# Patient Record
Sex: FEMALE | Race: WHITE | Employment: UNEMPLOYED | URBAN - METROPOLITAN AREA
[De-identification: names, ages, dates, MRNs, and addresses within clinical notes are randomized per-mention and may not be internally consistent; named-entity substitution may affect disease eponyms.]

---

## 2023-12-15 ENCOUNTER — HOSPITAL ENCOUNTER (EMERGENCY)
Age: 73
Discharge: HOME | End: 2023-12-16
Attending: EMERGENCY MEDICINE
Payer: MEDICARE

## 2023-12-15 DIAGNOSIS — R73.9 HYPERGLYCEMIA: Primary | ICD-10-CM

## 2023-12-15 DIAGNOSIS — R79.89 LFTS ABNORMAL: ICD-10-CM

## 2023-12-15 LAB
ALBUMIN SERPL-MCNC: 4.1 G/DL (ref 3.5–5.2)
ALP SERPL-CCNC: 250 U/L (ref 35–104)
ALT SERPL-CCNC: 39 U/L (ref 0–32)
ANION GAP SERPL CALCULATED.3IONS-SCNC: 15 MMOL/L (ref 7–16)
AST SERPL-CCNC: 35 U/L (ref 0–31)
B-OH-BUTYR SERPL-MCNC: 0.25 MMOL/L (ref 0.02–0.27)
BACTERIA URNS QL MICRO: ABNORMAL
BASOPHILS # BLD: 0.06 K/UL (ref 0–0.2)
BASOPHILS NFR BLD: 1 % (ref 0–2)
BILIRUB SERPL-MCNC: 0.5 MG/DL (ref 0–1.2)
BILIRUB UR QL STRIP: NEGATIVE
BUN SERPL-MCNC: 13 MG/DL (ref 6–23)
CALCIUM SERPL-MCNC: 9.3 MG/DL (ref 8.6–10.2)
CHLORIDE SERPL-SCNC: 95 MMOL/L (ref 98–107)
CLARITY UR: CLEAR
CO2 SERPL-SCNC: 22 MMOL/L (ref 22–29)
COLOR UR: YELLOW
CREAT SERPL-MCNC: 0.7 MG/DL (ref 0.5–1)
EOSINOPHIL # BLD: 0.18 K/UL (ref 0.05–0.5)
EOSINOPHILS RELATIVE PERCENT: 2 % (ref 0–6)
EPI CELLS #/AREA URNS HPF: ABNORMAL /HPF
ERYTHROCYTE [DISTWIDTH] IN BLOOD BY AUTOMATED COUNT: 12.3 % (ref 11.5–15)
GFR SERPL CREATININE-BSD FRML MDRD: >60 ML/MIN/1.73M2
GLUCOSE BLD-MCNC: 330 MG/DL (ref 74–99)
GLUCOSE SERPL-MCNC: 338 MG/DL (ref 74–99)
GLUCOSE UR STRIP-MCNC: >=1000 MG/DL
HCT VFR BLD AUTO: 46.1 % (ref 34–48)
HGB BLD-MCNC: 16 G/DL (ref 11.5–15.5)
HGB UR QL STRIP.AUTO: NEGATIVE
IMM GRANULOCYTES # BLD AUTO: 0.03 K/UL (ref 0–0.58)
IMM GRANULOCYTES NFR BLD: 0 % (ref 0–5)
KETONES UR STRIP-MCNC: NEGATIVE MG/DL
LACTATE BLDV-SCNC: 2.9 MMOL/L (ref 0.5–2.2)
LEUKOCYTE ESTERASE UR QL STRIP: NEGATIVE
LIPASE SERPL-CCNC: 17 U/L (ref 13–60)
LYMPHOCYTES NFR BLD: 2.98 K/UL (ref 1.5–4)
LYMPHOCYTES RELATIVE PERCENT: 38 % (ref 20–42)
MAGNESIUM SERPL-MCNC: 2.2 MG/DL (ref 1.6–2.6)
MCH RBC QN AUTO: 30.9 PG (ref 26–35)
MCHC RBC AUTO-ENTMCNC: 34.7 G/DL (ref 32–34.5)
MCV RBC AUTO: 89 FL (ref 80–99.9)
MONOCYTES NFR BLD: 0.44 K/UL (ref 0.1–0.95)
MONOCYTES NFR BLD: 6 % (ref 2–12)
NEUTROPHILS NFR BLD: 53 % (ref 43–80)
NEUTS SEG NFR BLD: 4.17 K/UL (ref 1.8–7.3)
NITRITE UR QL STRIP: NEGATIVE
PH UR STRIP: 6 [PH] (ref 5–9)
PH VENOUS: 7.33 (ref 7.35–7.45)
PLATELET # BLD AUTO: 207 K/UL (ref 130–450)
PMV BLD AUTO: 9.7 FL (ref 7–12)
POTASSIUM SERPL-SCNC: 4.5 MMOL/L (ref 3.5–5)
PROT SERPL-MCNC: 7.6 G/DL (ref 6.4–8.3)
PROT UR STRIP-MCNC: NEGATIVE MG/DL
RBC # BLD AUTO: 5.18 M/UL (ref 3.5–5.5)
RBC #/AREA URNS HPF: ABNORMAL /HPF
SODIUM SERPL-SCNC: 132 MMOL/L (ref 132–146)
SP GR UR STRIP: <1.005 (ref 1–1.03)
UROBILINOGEN UR STRIP-ACNC: 0.2 EU/DL (ref 0–1)
WBC #/AREA URNS HPF: ABNORMAL /HPF
WBC OTHER # BLD: 7.9 K/UL (ref 4.5–11.5)

## 2023-12-15 PROCEDURE — 2580000003 HC RX 258

## 2023-12-15 PROCEDURE — 82962 GLUCOSE BLOOD TEST: CPT

## 2023-12-15 PROCEDURE — 80053 COMPREHEN METABOLIC PANEL: CPT

## 2023-12-15 PROCEDURE — 82800 BLOOD PH: CPT

## 2023-12-15 PROCEDURE — 83605 ASSAY OF LACTIC ACID: CPT

## 2023-12-15 PROCEDURE — 83690 ASSAY OF LIPASE: CPT

## 2023-12-15 PROCEDURE — 81001 URINALYSIS AUTO W/SCOPE: CPT

## 2023-12-15 PROCEDURE — 82010 KETONE BODYS QUAN: CPT

## 2023-12-15 PROCEDURE — 83735 ASSAY OF MAGNESIUM: CPT

## 2023-12-15 PROCEDURE — 85025 COMPLETE CBC W/AUTO DIFF WBC: CPT

## 2023-12-15 RX ORDER — 0.9 % SODIUM CHLORIDE 0.9 %
1000 INTRAVENOUS SOLUTION INTRAVENOUS ONCE
Status: COMPLETED | OUTPATIENT
Start: 2023-12-15 | End: 2023-12-16

## 2023-12-15 RX ADMIN — SODIUM CHLORIDE 1000 ML: 9 INJECTION, SOLUTION INTRAVENOUS at 22:46

## 2023-12-15 ASSESSMENT — LIFESTYLE VARIABLES: HOW OFTEN DO YOU HAVE A DRINK CONTAINING ALCOHOL: NEVER

## 2023-12-15 ASSESSMENT — PAIN - FUNCTIONAL ASSESSMENT: PAIN_FUNCTIONAL_ASSESSMENT: NONE - DENIES PAIN

## 2023-12-16 ENCOUNTER — APPOINTMENT (OUTPATIENT)
Dept: CT IMAGING | Age: 73
End: 2023-12-16
Payer: MEDICARE

## 2023-12-16 VITALS
RESPIRATION RATE: 14 BRPM | SYSTOLIC BLOOD PRESSURE: 182 MMHG | HEART RATE: 80 BPM | OXYGEN SATURATION: 97 % | TEMPERATURE: 97.4 F | DIASTOLIC BLOOD PRESSURE: 86 MMHG | HEIGHT: 62 IN | BODY MASS INDEX: 36.25 KG/M2 | WEIGHT: 197 LBS

## 2023-12-16 LAB — LACTATE BLDV-SCNC: 2.3 MMOL/L (ref 0.5–2.2)

## 2023-12-16 PROCEDURE — 83605 ASSAY OF LACTIC ACID: CPT

## 2023-12-16 PROCEDURE — 74176 CT ABD & PELVIS W/O CONTRAST: CPT

## 2023-12-16 NOTE — ED NOTES
Pt brought back repeat lactic drawn and sent, repeat vital signs completed, pt aware waiting for us gallbladder, pt voiced \"didn't have gallbladder\", er Dr aware, Ultrasound  discontinued. Pt returned to waiting room. No voiced needs and or c/o's.      Shira Thomas, AIMEE  78/17/00 [id number]

## 2023-12-16 NOTE — ED PROVIDER NOTES
[facility]  Department of Emergency Medicine   EM Physician H&P                  Frank Degroot 81 y.o. female PMHx of type 2 diabetes, not on insulin, high blood pressure, breast cancer in remission presents to the ED c/o high blood sugar, frequent urination, concern for high blood sugar, also vision changes. Onset: Past several days, worse tonight. Location/Radiation: No specific area pain. Duration: Intermittent. Characterization: High blood sugar. Aggravating Factors: Not on insulin. Relieving Factors: None. Severity: Moderate. Patient reports that her point-of-care glucose monitor stated that it was above 600, this was yesterday. Then today she took her medicine but then the blood sugar kept climbing and was about 200 today. .       Assx Sxs: High blood sugar. She Denies: Fever/chills, chest pain, abdominal pain, nausea, vomiting, shortness of breath, palpitations numbness/ting. Review of Systems   Constitutional:  Negative for activity change, appetite change, chills, fatigue and fever. Respiratory:  Negative for shortness of breath. Cardiovascular:  Negative for chest pain, palpitations and leg swelling. Gastrointestinal:  Negative for abdominal distention and abdominal pain. Physical Exam  Constitutional:       General: She is not in acute distress. Appearance: Normal appearance. She is not ill-appearing or toxic-appearing. HENT:      Head: Normocephalic and atraumatic. Right Ear: External ear normal.      Left Ear: External ear normal.      Nose: Nose normal.      Mouth/Throat:      Mouth: Mucous membranes are moist.      Pharynx: Oropharynx is clear. Eyes:      Extraocular Movements: Extraocular movements intact. Pupils: Pupils are equal, round, and reactive to light. Cardiovascular:      Rate and Rhythm: Normal rate and regular rhythm. Pulses: Normal pulses. Heart sounds: Normal heart sounds.    Pulmonary:      Effort:

## 2024-02-02 ENCOUNTER — HOSPITAL ENCOUNTER (OUTPATIENT)
Age: 74
Discharge: HOME | End: 2024-02-04

## 2024-02-02 PROCEDURE — 88307 TISSUE EXAM BY PATHOLOGIST: CPT

## 2024-02-02 PROCEDURE — 88311 DECALCIFY TISSUE: CPT

## 2024-02-08 LAB — SURGICAL PATHOLOGY REPORT: NORMAL

## 2024-07-18 ENCOUNTER — APPOINTMENT (OUTPATIENT)
Dept: CT IMAGING | Age: 74
End: 2024-07-18
Payer: MEDICARE

## 2024-07-18 ENCOUNTER — HOSPITAL ENCOUNTER (EMERGENCY)
Age: 74
Discharge: HOME | End: 2024-07-18
Payer: MEDICARE

## 2024-07-18 VITALS
SYSTOLIC BLOOD PRESSURE: 141 MMHG | WEIGHT: 200 LBS | RESPIRATION RATE: 18 BRPM | TEMPERATURE: 96.8 F | HEART RATE: 85 BPM | DIASTOLIC BLOOD PRESSURE: 89 MMHG | OXYGEN SATURATION: 99 % | BODY MASS INDEX: 36.8 KG/M2 | HEIGHT: 62 IN

## 2024-07-18 DIAGNOSIS — S32.000A COMPRESSION FRACTURE OF LUMBAR VERTEBRA, INITIAL ENCOUNTER (HCC): Primary | ICD-10-CM

## 2024-07-18 DIAGNOSIS — M54.50 ACUTE EXACERBATION OF CHRONIC LOW BACK PAIN: ICD-10-CM

## 2024-07-18 DIAGNOSIS — S22.000A COMPRESSION FRACTURE OF THORACIC VERTEBRA, UNSPECIFIED THORACIC VERTEBRAL LEVEL, INITIAL ENCOUNTER (HCC): ICD-10-CM

## 2024-07-18 DIAGNOSIS — M53.9 MULTILEVEL DEGENERATIVE DISC DISEASE: ICD-10-CM

## 2024-07-18 DIAGNOSIS — M51.36 BULGING LUMBAR DISC: ICD-10-CM

## 2024-07-18 DIAGNOSIS — K76.9 LIVER LESION: ICD-10-CM

## 2024-07-18 DIAGNOSIS — G89.29 ACUTE EXACERBATION OF CHRONIC LOW BACK PAIN: ICD-10-CM

## 2024-07-18 LAB — GLUCOSE BLD-MCNC: 174 MG/DL (ref 74–99)

## 2024-07-18 PROCEDURE — 6360000002 HC RX W HCPCS: Performed by: PHYSICIAN ASSISTANT

## 2024-07-18 PROCEDURE — 99284 EMERGENCY DEPT VISIT MOD MDM: CPT

## 2024-07-18 PROCEDURE — 82962 GLUCOSE BLOOD TEST: CPT

## 2024-07-18 PROCEDURE — 72128 CT CHEST SPINE W/O DYE: CPT

## 2024-07-18 PROCEDURE — 96372 THER/PROPH/DIAG INJ SC/IM: CPT

## 2024-07-18 PROCEDURE — 72131 CT LUMBAR SPINE W/O DYE: CPT

## 2024-07-18 PROCEDURE — 6370000000 HC RX 637 (ALT 250 FOR IP): Performed by: PHYSICIAN ASSISTANT

## 2024-07-18 RX ORDER — ONDANSETRON 4 MG/1
4 TABLET, ORALLY DISINTEGRATING ORAL ONCE
Status: COMPLETED | OUTPATIENT
Start: 2024-07-18 | End: 2024-07-18

## 2024-07-18 RX ORDER — NALOXONE HYDROCHLORIDE 4 MG/.1ML
1 SPRAY NASAL PRN
Qty: 2 EACH | Refills: 0 | Status: SHIPPED | OUTPATIENT
Start: 2024-07-18

## 2024-07-18 RX ORDER — TIZANIDINE 4 MG/1
4 TABLET ORAL ONCE
Status: COMPLETED | OUTPATIENT
Start: 2024-07-18 | End: 2024-07-18

## 2024-07-18 RX ORDER — OXYCODONE HYDROCHLORIDE 5 MG/1
5 TABLET ORAL
Qty: 12 TABLET | Refills: 0 | Status: SHIPPED | OUTPATIENT
Start: 2024-07-18 | End: 2024-07-21

## 2024-07-18 RX ORDER — TIZANIDINE 4 MG/1
2-4 TABLET ORAL EVERY 8 HOURS PRN
Qty: 30 TABLET | Refills: 0 | Status: SHIPPED | OUTPATIENT
Start: 2024-07-18

## 2024-07-18 RX ORDER — LIDOCAINE 50 MG/G
1 PATCH TOPICAL EVERY 24 HOURS
Qty: 10 PATCH | Refills: 0 | Status: SHIPPED | OUTPATIENT
Start: 2024-07-18 | End: 2024-07-28

## 2024-07-18 RX ORDER — OXYCODONE HYDROCHLORIDE AND ACETAMINOPHEN 5; 325 MG/1; MG/1
1 TABLET ORAL ONCE
Status: COMPLETED | OUTPATIENT
Start: 2024-07-18 | End: 2024-07-18

## 2024-07-18 RX ADMIN — ONDANSETRON 4 MG: 4 TABLET, ORALLY DISINTEGRATING ORAL at 14:25

## 2024-07-18 RX ADMIN — HYDROMORPHONE HYDROCHLORIDE 1 MG: 1 INJECTION, SOLUTION INTRAMUSCULAR; INTRAVENOUS; SUBCUTANEOUS at 14:25

## 2024-07-18 RX ADMIN — OXYCODONE HYDROCHLORIDE AND ACETAMINOPHEN 1 TABLET: 5; 325 TABLET ORAL at 17:34

## 2024-07-18 RX ADMIN — TIZANIDINE 4 MG: 4 TABLET ORAL at 17:34

## 2024-07-18 NOTE — ED NOTES
The PT is moved to results waiting post dilaudid injection.  Christy, the dispo nurse, is advised that the PT received a dilaudid injection is advised of the PT's location.  She advises she will reassess the PT.

## 2024-07-18 NOTE — DISCHARGE INSTRUCTIONS
Do not take Norco and Oxycodone at the same time.  Take one or the other, not both    Do not take pain medication with muscle relaxers due to sedation effects    Admission was discussed.  If you do change your mind and feel like you cannot care for yourself at home and if you do not feel safe, please return to the emergency department for admission and pain control.      Call PCP to make follow-up appointment for re-evaluation of incidental liver lesion found on CT scan

## 2024-07-18 NOTE — ED PROVIDER NOTES
Independent ANU Visit.     Department of Emergency Medicine   ED  Provider Note  Admit Date/RoomTime: 2024  1:28 PM  ED Room:    Chief Complaint:   Back Pain (back pain x 7 days, hx of fractures)    History of Present Illness      Christina Herrmann is a 73 y.o. old female who presents to the ED for low back pain that has been worsening over the past couple days.  Patient states she does have a history of chronic back pain.  She has had lumbar fractures in the past.  She denies any new injury or trauma.  She does follow with pain management and saw them this morning.  They advised her to come to the emergency department for further pain control and evaluation.  Patient denies any loss of bowel or bladder or saddle paresthesias.  She states her pain is worse with movement and changing positions.  She denies numbness/tingling, sensation changes, or leg swelling.  Patient states she does take Norco 10/325mg daily but it is not helping her pain.  She denies chest pain, shortness of breath, or pain with breathing.  Patient denies any nausea, vomiting, diarrhea, or urinary complaints.  She is alert and oriented x 3 and in no apparent distress at this exam.  Patient is nontoxic-appearing.  Patient states she lives at home with her son and .    PCP: Luis Enrique Coronel Jr., DO    ROS   Pertinent positives and negatives are stated within HPI, all other systems reviewed and are negative.    Past Medical History:  has a past medical history of Acid reflux, Anxiety, Arthritis, Bronchitis, Chronic back pain, Chronic sinusitis, Colitis, Hard to intubate, Headache(784.0), History of cardiac cath, Hyperlipidemia, Hypertension, Hypothyroidism, Morbid obesity (HCC), Night sweats, Osteoporosis, Rapid heart beat, and SOB (shortness of breath) on exertion.    Past Surgical History:  has a past surgical history that includes  section ( - Dr. Rebekah Chand SE);  section ( - Dr. Rebekah ABRAHAM); Hysterectomy

## 2024-08-09 ENCOUNTER — HOSPITAL ENCOUNTER (OUTPATIENT)
Age: 74
Discharge: HOME | End: 2024-08-11

## 2024-08-09 PROCEDURE — 88307 TISSUE EXAM BY PATHOLOGIST: CPT

## 2024-08-09 PROCEDURE — 88311 DECALCIFY TISSUE: CPT

## 2024-08-16 LAB — SURGICAL PATHOLOGY REPORT: NORMAL
